# Patient Record
Sex: FEMALE | Race: WHITE | Employment: FULL TIME | URBAN - METROPOLITAN AREA
[De-identification: names, ages, dates, MRNs, and addresses within clinical notes are randomized per-mention and may not be internally consistent; named-entity substitution may affect disease eponyms.]

---

## 2017-08-08 ENCOUNTER — OFFICE VISIT (OUTPATIENT)
Dept: FAMILY MEDICINE | Facility: CLINIC | Age: 62
End: 2017-08-08

## 2017-08-08 VITALS
OXYGEN SATURATION: 95 % | DIASTOLIC BLOOD PRESSURE: 80 MMHG | SYSTOLIC BLOOD PRESSURE: 110 MMHG | WEIGHT: 151 LBS | HEIGHT: 67 IN | HEART RATE: 84 BPM | BODY MASS INDEX: 23.7 KG/M2

## 2017-08-08 DIAGNOSIS — M85.80 OSTEOPENIA, UNSPECIFIED LOCATION: ICD-10-CM

## 2017-08-08 DIAGNOSIS — H81.03 MENIERE'S DISEASE, BILATERAL: ICD-10-CM

## 2017-08-08 DIAGNOSIS — R42 VERTIGO: ICD-10-CM

## 2017-08-08 DIAGNOSIS — Z12.11 SPECIAL SCREENING FOR MALIGNANT NEOPLASMS, COLON: ICD-10-CM

## 2017-08-08 DIAGNOSIS — Z12.31 VISIT FOR SCREENING MAMMOGRAM: ICD-10-CM

## 2017-08-08 DIAGNOSIS — B00.1 RECURRENT COLD SORES: ICD-10-CM

## 2017-08-08 DIAGNOSIS — Z00.00 ROUTINE GENERAL MEDICAL EXAMINATION AT A HEALTH CARE FACILITY: Primary | ICD-10-CM

## 2017-08-08 DIAGNOSIS — R53.83 FATIGUE, UNSPECIFIED TYPE: ICD-10-CM

## 2017-08-08 DIAGNOSIS — Q43.8 REDUNDANT COLON: ICD-10-CM

## 2017-08-08 DIAGNOSIS — G35 MULTIPLE SCLEROSIS (H): ICD-10-CM

## 2017-08-08 LAB
% GRANULOCYTES: 62.2 % (ref 42.2–75.2)
HCT VFR BLD AUTO: 40 % (ref 35–46)
HEMOGLOBIN: 13.4 G/DL (ref 11.8–15.5)
LYMPHOCYTES NFR BLD AUTO: 31.1 % (ref 20.5–51.1)
MCH RBC QN AUTO: 29.5 PG (ref 27–31)
MCHC RBC AUTO-ENTMCNC: 33.6 G/DL (ref 33–37)
MCV RBC AUTO: 87.8 FL (ref 80–100)
MONOCYTES NFR BLD AUTO: 6.7 % (ref 1.7–9.3)
PLATELET # BLD AUTO: 228 K/UL (ref 140–450)
RBC # BLD AUTO: 4.55 X10/CMM (ref 3.7–5.2)
WBC # BLD AUTO: 6.3 X10/CMM (ref 3.8–11)

## 2017-08-08 PROCEDURE — 86618 LYME DISEASE ANTIBODY: CPT | Mod: 90 | Performed by: FAMILY MEDICINE

## 2017-08-08 PROCEDURE — 82607 VITAMIN B-12: CPT | Mod: 90 | Performed by: FAMILY MEDICINE

## 2017-08-08 PROCEDURE — 99213 OFFICE O/P EST LOW 20 MIN: CPT | Mod: 25 | Performed by: FAMILY MEDICINE

## 2017-08-08 PROCEDURE — 80061 LIPID PANEL: CPT | Mod: 90 | Performed by: FAMILY MEDICINE

## 2017-08-08 PROCEDURE — 99386 PREV VISIT NEW AGE 40-64: CPT | Performed by: FAMILY MEDICINE

## 2017-08-08 PROCEDURE — 36415 COLL VENOUS BLD VENIPUNCTURE: CPT | Performed by: FAMILY MEDICINE

## 2017-08-08 PROCEDURE — 80050 GENERAL HEALTH PANEL: CPT | Mod: 90 | Performed by: FAMILY MEDICINE

## 2017-08-08 PROCEDURE — 82306 VITAMIN D 25 HYDROXY: CPT | Mod: 90 | Performed by: FAMILY MEDICINE

## 2017-08-08 PROCEDURE — 84439 ASSAY OF FREE THYROXINE: CPT | Mod: 90 | Performed by: FAMILY MEDICINE

## 2017-08-08 RX ORDER — BACLOFEN 10 MG/1
TABLET ORAL
Refills: 2 | COMMUNITY
Start: 2017-03-11

## 2017-08-08 RX ORDER — ACYCLOVIR 400 MG/1
400 TABLET ORAL
COMMUNITY
Start: 2017-08-07 | End: 2017-08-08

## 2017-08-08 RX ORDER — HYDROCHLOROTHIAZIDE 12.5 MG/1
12.5 TABLET ORAL DAILY
Qty: 30 TABLET | Refills: 1 | Status: SHIPPED | OUTPATIENT
Start: 2017-08-08 | End: 2018-08-27

## 2017-08-08 RX ORDER — ACYCLOVIR 400 MG/1
400 TABLET ORAL 3 TIMES DAILY
Qty: 15 TABLET | Refills: 11 | Status: SHIPPED | OUTPATIENT
Start: 2017-08-08 | End: 2018-08-27

## 2017-08-08 NOTE — PROGRESS NOTES
SUBJECTIVE:   CC: Pau Waddell is an 61 year old woman who presents for preventive health visit.       MEDICAL PROBLEMS:  1. MS: Sees neurologist at [facility]. Due for MRI brain in January.  2. Recurrent cold sores: takes acyclovir at the first sign of cold sore, would like refills.  3. Vertigo/Tinnitus/hearing loss: no formal diagnosis of Meniere's disease, but still chronically affected by vertigo. She has never tried treatment for this other than prn Meclizine.  4. Osteopenia: takes calcium and vitamin D, tries to get exercise walking but she has a bad hip.  5. She reports severe fatigue this summer that is similar to what she has had with her MS. She sleeps long hours and feels exhuasted. [place] and although she has never seen a tick on her or the typical rash, she wonders if this could be related to Lyme's disease.    Healthy Habits:    Do you get at least three servings of calcium containing foods daily (dairy, green leafy vegetables, etc.)? yes and no, taking calcium and/or vitamin D supplement    Amount of exercise or daily activities, outside of work: 7 day(s) per week, walking, yard work    Problems taking medications regularly No    Medication side effects: No    Have you had an eye exam in the past two years? yes    Do you see a dentist twice per year? yes  Do you have sleep apnea, excessive snoring or daytime drowsiness?yes, daytime drowsiness    HEARING FREQUENCY:   Right Ear:  1000 Hz: failed    Left Ear:  1000 Hz: failed        Today's PHQ-2 Score:   PHQ-2 ( 1999 Pfizer) 8/8/2017   Q1: Little interest or pleasure in doing things 0   Q2: Feeling down, depressed or hopeless 0   PHQ-2 Score 0         Abuse: Current or Past(Physical, Sexual or Emotional)- No  Do you feel safe in your environment - Yes  Social History   Substance Use Topics     Smoking status: Never Smoker     Smokeless tobacco: Not on file      Comment: not exposed anymore to 2nd  hand smoke     Alcohol use Not on file     The patient does not drink >3 drinks per day nor >7 drinks per week.    Reviewed orders with patient.  Reviewed health maintenance and updated orders accordingly - Yes  Labs reviewed in EPIC  BP Readings from Last 3 Encounters:   08/08/17 110/80   03/15/11 125/76   11/19/05 110/64    Wt Readings from Last 3 Encounters:   08/08/17 68.5 kg (151 lb)   03/15/11 75.5 kg (166 lb 6.4 oz)   11/26/08 75.2 kg (165 lb 11.2 oz)                  Patient Active Problem List   Diagnosis     CARDIOVASCULAR SCREENING; LDL GOAL LESS THAN 160     Multiple sclerosis (H)     Dizziness and giddiness     No past surgical history on file.    Social History   Substance Use Topics     Smoking status: Never Smoker     Smokeless tobacco: Not on file      Comment: not exposed anymore to 2nd hand smoke     Alcohol use Not on file     Family History   Problem Relation Age of Onset     Coronary Artery Disease Mother      Arthritis Mother      Prostate Cancer Father      Myocardial Infarction Sister 76     Myocardial Infarction Brother 75     DIABETES Brother      Uterine Cancer Sister 62         Current Outpatient Prescriptions   Medication Sig Dispense Refill     baclofen (LIORESAL) 10 MG tablet TK 1 TO 2 TS PO Q 8 H PRF MUSCLE SPASM  2     Omega-3 Fatty Acids (FISH OIL PO)        acyclovir (ZOVIRAX) 400 MG tablet Take 1 tablet (400 mg) by mouth 3 times daily for 5 days 15 tablet 11     hydrochlorothiazide 12.5 MG TABS tablet Take 1 tablet (12.5 mg) by mouth daily 30 tablet 1     meclizine (ANTIVERT) 12.5 MG tablet Take 1-2 tablets by mouth 2 times daily as needed.       COPAXONE 20 MG SC SOLR 20 MG DAILY       FOLIC ACID 1 MG OR TABS ONE DAILY       CALCIUM 500 + D 500-125 MG-UNIT OR TABS 1 tab daily       VITAMIN C 500 MG OR TABS 4 tabs daily       CRANBERRY 250 MG OR TABS 2 tabs daily       [DISCONTINUED] acyclovir (ZOVIRAX) 400 MG tablet Take 400 mg by mouth       Allergies   Allergen Reactions      "Fentanyl      emesis       Iohexol      PN: Omnipaque     Omnipaque [[employer]]      hives       No lab results found.           Patient over age 50, mutual decision to screen reflected in health maintenance.      Pertinent mammograms are reviewed under the imaging tab.  History of abnormal Pap smear: NO - age 30- 65 PAP every 3 years recommended    Reviewed and updated as needed this visit by clinical staffTobacco  Allergies  Meds         Reviewed and updated as needed this visit by Provider        No past medical history on file.   No past surgical history on file.  Obstetric History       T2      L2     SAB0   TAB0   Ectopic0   Multiple0   Live Births0       # Outcome Date GA Lbr Milind/2nd Weight Sex Delivery Anes PTL Lv   2 Term            1 Term                   ROS: (except as noted above)  C: NEGATIVE for fever, chills, change in weight  I: NEGATIVE for worrisome rashes, moles or lesions  E: NEGATIVE for vision changes or irritation  ENT: NEGATIVE for ear, mouth and throat problems  R: NEGATIVE for significant cough or SOB  B: NEGATIVE for masses, tenderness or discharge  CV: NEGATIVE for chest pain, palpitations or peripheral edema  GI: NEGATIVE for nausea, abdominal pain, heartburn, or change in bowel habits  : NEGATIVE for unusual urinary or vaginal symptoms. No vaginal bleeding.  M: NEGATIVE for significant arthralgias or myalgia  N: NEGATIVE for weakness, dizziness or paresthesias  P: NEGATIVE for changes in mood or affect     OBJECTIVE:   /80  Pulse 84  Ht 1.695 m (5' 6.75\")  Wt 68.5 kg (151 lb)  SpO2 95%  BMI 23.83 kg/m2  EXAM:  GENERAL APPEARANCE: healthy, alert and no distress  EYES: Eyes grossly normal to inspection, PERRL and conjunctivae and sclerae normal  HENT: ear canals and TM's normal, nose and mouth without ulcers or lesions, oropharynx clear and oral mucous membranes moist  NECK: no adenopathy, no asymmetry, masses, or scars and thyroid normal " to palpation  RESP: lungs clear to auscultation - no rales, rhonchi or wheezes  BREAST: normal without masses, tenderness or nipple discharge and no palpable axillary masses or adenopathy  CV: regular rate and rhythm, normal S1 S2, no S3 or S4, no murmur, click or rub, no peripheral edema and peripheral pulses strong  ABDOMEN: soft, nontender, no hepatosplenomegaly, no masses and bowel sounds normal   (female): normal female external genitalia, normal urethral meatus, vaginal mucosal atrophy noted, normal cervix, adnexae, and uterus without masses or abnormal discharge  MS: no musculoskeletal defects are noted and gait is age appropriate without ataxia  SKIN: no suspicious lesions or rashes  NEURO: Normal strength and tone, sensory exam grossly normal, mentation intact and speech normal  PSYCH: mentation appears normal and affect normal/bright    ASSESSMENT/PLAN:   Pau was seen today for physical and hearing screening.    Diagnoses and all orders for this visit:    Routine general medical examination at a health care facility  -     Lipid Panel ([facility])  -     Pap IG rfx HPV ASCU ([facility])    Multiple sclerosis (H)    Recurrent cold sores  -     acyclovir (ZOVIRAX) 400 MG tablet; Take 1 tablet (400 mg) by mouth 3 times daily for 5 days    Vertigo/Meniere's disease, bilateral  -     hydrochlorothiazide 12.5 MG TABS tablet; Take 1 tablet (12.5 mg) by mouth daily  Follow up in a month to discuss results of HCTZ use for prevention of dizziness.    Osteopenia, unspecified location  -     Vitamin D  25-Hydroxy ([facility])  Continue vitamin D and calcium supplements.    Fatigue, unspecified type, follow up pending lab results.  -     TSH ([facility])  -     Thyroxine (T4) Free  Direct  S ([facility])  -     CBC with Diff/Plt ([facility])  -     Comp. Metabolic Panel (14) ([facility])  -     Vitamin D  25-Hydroxy ([facility])  -     Vitamin B12 ([facility])  -     Lyme  Total Ab Test/Reflex ([facility])    Visit for screening mammogram  -      "MAMMO -  Screening Digital Bilateral ([facility]); Future    Special screening for malignant neoplasms, colon/Redundant colon  -     GASTROENTEROLOGY ADULT REF CONSULT ONLY for colonoscopy          COUNSELING:   Reviewed preventive health counseling, as reflected in patient instructions       Regular exercise       Healthy diet/nutrition       Vision screening       Hearing screening       Osteoporosis Prevention/Bone Health       Colon cancer screening         reports that she has never smoked. She does not have any smokeless tobacco history on file.    Estimated body mass index is 23.83 kg/(m^2) as calculated from the following:    Height as of this encounter: 1.695 m (5' 6.75\").    Weight as of this encounter: 68.5 kg (151 lb).         Counseling Resources:  ATP IV Guidelines  Pooled Cohorts Equation Calculator  Breast Cancer Risk Calculator  FRAX Risk Assessment  ICSI Preventive Guidelines  Dietary Guidelines for Americans, 2010  USDA's MyPlate  ASA Prophylaxis  Lung CA Screening    Ariadna Shine MD  [facility]  "

## 2017-08-08 NOTE — MR AVS SNAPSHOT
After Visit Summary   8/8/2017    Pau Waddell    MRN: [id number]           Patient Information     Date Of Birth          1955        Visit Information        Provider Department      8/8/2017 8:00 AM Ariadna Shine MD [facility]        Today's Diagnoses     Routine general medical examination at a health care facility    -  1    Multiple sclerosis (H)        Redundant colon        Recurrent cold sores        Vertigo        Meniere's disease, bilateral        Osteopenia, unspecified location        Fatigue, unspecified type        Visit for screening mammogram        Special screening for malignant neoplasms, colon          Care Instructions      Preventive Health Recommendations  Female Ages 50 - 64    Yearly exam: See your health care provider every year in order to  o Review health changes.   o Discuss preventive care.    o Review your medicines if your doctor has prescribed any.      Get a Pap test every three years (unless you have an abnormal result and your provider advises testing more often).    If you get Pap tests with HPV test, you only need to test every 5 years, unless you have an abnormal result.     You do not need a Pap test if your uterus was removed (hysterectomy) and you have not had cancer.    You should be tested each year for STDs (sexually transmitted diseases) if you're at risk.     Have a mammogram every 1 to 2 years.    Have a colonoscopy at age 50, or have a yearly FIT test (stool test). These exams screen for colon cancer.      Have a cholesterol test every 5 years, or more often if advised.    Have a diabetes test (fasting glucose) every three years. If you are at risk for diabetes, you should have this test more often.     If you are at risk for osteoporosis (brittle bone disease), think about having a bone density scan (DEXA).    Shots: Get a flu shot each year. Get a tetanus shot every 10 years.    Nutrition:     Eat at least 5 servings  of fruits and vegetables each day.    Eat whole-grain bread, whole-wheat pasta and brown rice instead of white grains and rice.    Talk to your provider about Calcium and Vitamin D.     Lifestyle    Exercise at least 150 minutes a week (30 minutes a day, 5 days a week). This will help you control your weight and prevent disease.    Limit alcohol to one drink per day.    No smoking.     Wear sunscreen to prevent skin cancer.     See your dentist every six months for an exam and cleaning.    See your eye doctor every 1 to 2 years.            Follow-ups after your visit        Additional Services     GASTROENTEROLOGY ADULT REF CONSULT ONLY       Preferred Location: [facility] [phone]      Please be aware that coverage of these services is subject to the terms and limitations of your health insurance plan.  Call member services at your health plan with any benefit or coverage questions.  Any procedures must be performed at a [facility] facility OR coordinated by your clinic's referral office.    Please bring the following with you to your appointment:    (1) Any X-Rays, CTs or MRIs which have been performed.  Contact the facility where they were done to arrange for  prior to your scheduled appointment.    (2) List of current medications   (3) This referral request   (4) Any documents/labs given to you for this referral                  Future tests that were ordered for you today     Open Future Orders        Priority Expected Expires Ordered    MAMMO -  Screening Digital Bilateral ([facility]) Routine  8/8/2018 8/8/2017            Who to contact     If you have questions or need follow up information about today's clinic visit or your schedule please contact [facility] directly at [phone].  Normal or non-critical lab and imaging results will be communicated to you by MyChart, letter or phone within 4 business days after the clinic has received the results. If you do not hear from us  "within 7 days, please contact the clinic through [facility] or phone. If you have a critical or abnormal lab result, we will notify you by phone as soon as possible.  Submit refill requests through [facility] or call your pharmacy and they will forward the refill request to us. Please allow 3 business days for your refill to be completed.          Additional Information About Your Visit        [facility] Information     [facility] lets you send messages to your doctor, view your test results, renew your prescriptions, schedule appointments and more. To sign up, go to [email or web address] . Click on \"Log in\" on the left side of the screen, which will take you to the Welcome page. Then click on \"Sign up Now\" on the right side of the page.     You will be asked to enter the access code listed below, as well as some personal information. Please follow the directions to create your username and password.     Your access code is: [email or web address]  Expires: 2017 12:56 PM     Your access code will  in 90 days. If you need help or a new code, please call your [facility] clinic or [phone].        Care EveryWhere ID     This is your Care EveryWhere ID. This could be used by other organizations to access your [facility] medical records  [id number]        Your Vitals Were     Pulse Height Pulse Oximetry BMI (Body Mass Index)          84 1.695 m (5' 6.75\") 95% 23.83 kg/m2         Blood Pressure from Last 3 Encounters:   17 110/80   03/15/11 125/76   05 110/64    Weight from Last 3 Encounters:   17 68.5 kg (151 lb)   03/15/11 75.5 kg (166 lb 6.4 oz)   08 75.2 kg (165 lb 11.2 oz)              We Performed the Following     CBC with Diff/Plt ([facility])     Comp. Metabolic Panel (14) ([facility])     GASTROENTEROLOGY ADULT REF CONSULT ONLY     Lipid Panel ([facility])     Lyme  Total Ab Test/Reflex ([facility])     Pap IG rfx HPV ASCU ([facility])     Thyroxine (T4) Free  Direct  S ([facility])     TSH ([facility])     Vitamin B12 " ([facility])     Vitamin D  25-Hydroxy ([facility])          Today's Medication Changes          These changes are accurate as of: 8/8/17 12:56 PM.  If you have any questions, ask your nurse or doctor.               Start taking these medicines.        Dose/Directions    hydrochlorothiazide 12.5 MG Tabs tablet   Used for:  Meniere's disease, bilateral   Started by:  Ariadna Shine MD        Dose:  12.5 mg   Take 1 tablet (12.5 mg) by mouth daily   Quantity:  30 tablet   Refills:  1         These medicines have changed or have updated prescriptions.        Dose/Directions    acyclovir 400 MG tablet   Commonly known as:  ZOVIRAX   This may have changed:  when to take this   Used for:  Recurrent cold sores   Changed by:  Ariadna Shine MD        Dose:  400 mg   Take 1 tablet (400 mg) by mouth 3 times daily for 5 days   Quantity:  15 tablet   Refills:  11            Where to get your medicines      These medications were sent to [facility] [address] [place] [address] [address] [facility] [place] [address] [facility] [address]     Phone:  [phone]     acyclovir 400 MG tablet    hydrochlorothiazide 12.5 MG Tabs tablet                Primary Care Provider Office Phone # Fax #    Ariadna Shine [phone]       [facility] [address]  [facility] [address]        Equal Access to Services     [facility] AH: Hadii aad ku hadasho Soomaali, waaxda luqadaha, qaybta kaalmada adeegyada, shante martines hayjairn victor hugo shetty . So [facility] [phone].    ATENCIÓN: Si habla español, tiene a blanco disposición servicios gratuitos de asistencia lingüística. Llame al [phone].    We comply with applicable federal civil rights laws and [place] laws. We do not discriminate on the basis of race, color, national origin, age, disability sex, sexual orientation or gender identity.            Thank you!     Thank you for choosing [facility]   for your care. Our goal is always to provide you with excellent care. Hearing back from our patients is one way we can continue to improve our services. Please take a few minutes to complete the written survey that you may receive in the mail after your visit with us. Thank you!             Your Updated Medication List - Protect others around you: Learn how to safely use, store and throw away your medicines at www.disposemymeds.org.          This list is accurate as of: 8/8/17 12:56 PM.  Always use your most recent med list.                   Brand Name Dispense Instructions for use Diagnosis    acyclovir 400 MG tablet    ZOVIRAX    15 tablet    Take 1 tablet (400 mg) by mouth 3 times daily for 5 days    Recurrent cold sores       ascorbic acid 500 MG tablet    VITAMIN C     4 tabs daily        baclofen 10 MG tablet    LIORESAL     TK 1 TO 2 TS PO Q 8 H PRF MUSCLE SPASM        CALCIUM 500 + D 500-125 MG-UNIT Tabs   Generic drug:  Calcium Carbonate-Vitamin D      1 tab daily        COPAXONE 20 MG Solr      20 MG DAILY        Cranberry 250 MG Tabs      2 tabs daily        FISH OIL PO           folic acid 1 MG tablet    FOLVITE     ONE DAILY        hydrochlorothiazide 12.5 MG Tabs tablet     30 tablet    Take 1 tablet (12.5 mg) by mouth daily    Meniere's disease, bilateral       meclizine 12.5 MG tablet    ANTIVERT     Take 1-2 tablets by mouth 2 times daily as needed.

## 2017-08-08 NOTE — LETTER
[facility] [address] [place]  [address]  Phone: [phone]    August 14, 2017      Pau Juliaenedeliadenver  [address] [facility] [address]              Dear Pau,    Blood counts are normal.  Thyroid is normal.  Vitamin D level is low normal. Given your history of osteopenia I would recommend increasing Vitamin D supplement by 1,000 IU daily year round.  Vitamin B12 is in the low normal range. Recommend supplement as this could be playing a role in your fatigue.  No evidence of Lyme disease.  Liver function, kidney function, and electrolytes are normal. Normal glucose.  Total cholesterol, triglycerides, and LDL cholesterol are a bit elevated. I would recommend following a low saturated fat diet and limiting simple sugars. Recheck annually.  Pap was unsatisfactory due to sample being too small. We will repeat this next year.        Sincerely,     Ariadna Shine M.D.    Results for orders placed or performed in visit on 08/08/17   TSH ([facility])   Result Value Ref Range    TSH 2.930 0.450 - 4.500 uIU/mL    Narrative    Performed at:  [facility] [address], [place], [place]  [id number]  : Josiah Retana MD, Phone:  [phone]   Thyroxine (T4) Free  Direct  S ([facility])   Result Value Ref Range    T4 Free 1.28 0.82 - 1.77 ng/dL    Narrative    Performed at:  [facility] [address], [place], [place]  [id number]  : Josiah Retana MD, Phone:  [phone]   CBC with Diff/Plt (G)   Result Value Ref Range    WBC x10/cmm 6.3 3.8 - 11.0 x10/cmm    % Lymphocytes 31.1 20.5 - 51.1 %    % Monocytes 6.7 1.7 - 9.3 %    % Granulocytes 62.2 42.2 - 75.2 %    RBC x10/cmm 4.55 3.7 - 5.2 x10/cmm    Hemoglobin 13.4 11.8 - 15.5 g/dl    Hematocrit 40.0 35 - 46 %    MCV 87.8 80 - 100 fL    MCH 29.5 27.0 - 31.0 pg    MCHC 33.6 33.0 - 37.0 g/dL    Platelet Count 228 140 - 450 K/uL   Comp. Metabolic Panel (14) ([facility])   Result Value Ref Range    Glucose 92  65 - 99 mg/dL    Urea Nitrogen 9 8 - 27 mg/dL    Creatinine 0.61 0.57 - 1.00 mg/dL    eGFR If NonAfricn Am 98 >59 mL/min/1.73    eGFR If Africn Am 113 >59 mL/min/1.73    BUN/Creatinine Ratio 15 12 - 28    Sodium 140 134 - 144 mmol/L    Potassium 4.8 3.5 - 5.2 mmol/L    Chloride 99 96 - 106 mmol/L    Total CO2 31 (H) 18 - 28 mmol/L    Calcium 9.7 8.7 - 10.3 mg/dL    Protein Total 7.2 6.0 - 8.5 g/dL    Albumin 4.3 3.6 - 4.8 g/dL    Globulin, Total 2.9 1.5 - 4.5 g/dL    A/G Ratio 1.5 1.2 - 2.2    Bilirubin Total 0.5 0.0 - 1.2 mg/dL    Alkaline Phosphatase 77 39 - 117 IU/L    AST 18 0 - 40 IU/L    ALT 16 0 - 32 IU/L    Narrative    Performed at:  01 - [facility] [address], [place], [place]  [id number]  : Josiah Retana MD, Phone:  [phone]   [facility]   Result Value Ref Range    Vitamin D,25-Hydroxy 33.5 30.0 - 100.0 ng/mL    Narrative    Performed at:  01 - [facility] [address], [place], [place]  [id number]  : Josiah Retana MD, Phone:  [phone]   Vitamin B12 ([facility])   Result Value Ref Range    Vitamin B12 313 211 - 946 pg/mL    Narrative    Performed at:  01 - [facility] [address], [place], [place]  [id number]  : Josiah Retana MD, Phone:  [phone]   Lyme  Total Ab Test/Reflex ([facility])   Result Value Ref Range    Lyme IgG/IgM Ab <0.91 0.00 - 0.90 ISR    Narrative    Performed at:  [address]  [id number]  : KEYSHA Del Valle MD, Phone:  [phone]   Lipid Panel ([facility])   Result Value Ref Range    Cholesterol 233 (H) 100 - 199 mg/dL    Triglycerides 220 (H) 0 - 149 mg/dL    HDL Cholesterol 57 >39 mg/dL    VLDL Cholesterol Ole 44 (H) 5 - 40 mg/dL    LDL Cholesterol Calculated 132 (H) 0 - 99 mg/dL    LDL/HDL Ratio 2.3 0.0 - 3.2 ratio units    Narrative    Performed at:  01 - [facility] [address], [place], [place]  [id number]  : Josiah Retana MD, Phone:   [phone]   Pap IG rfx HPV ASCU ([facility])   Result Value Ref Range    DIAGNOSIS: Comment     Specimen adequacy: Comment     Clinician Provided ICD10 Comment     Performed by: Comment     QC reviewed by: Comment     . .     Pathologist Provided ICD10 Comment     Note: Comment     Test Methodology: Comment     . Comment     Narrative    Performed at:  01 - [facility]  [address] [place], [place]  [id number]  : Rosa Linton MD, Phone:  [phone]  Specimen Comment: Source.............Cervix  Specimen Comment: LMP / Prev Treat...None  Specimen Comment: Other..............Post Menopausal  Specimen Comment: No. of containers..01 CYTYC Thin Prep Vial

## 2017-08-10 LAB
ALBUMIN SERPL-MCNC: 4.3 G/DL (ref 3.6–4.8)
ALBUMIN/GLOB SERPL: 1.5 {RATIO} (ref 1.2–2.2)
ALP SERPL-CCNC: 77 IU/L (ref 39–117)
ALT SERPL-CCNC: 16 IU/L (ref 0–32)
AST SERPL-CCNC: 18 IU/L (ref 0–40)
BILIRUB SERPL-MCNC: 0.5 MG/DL (ref 0–1.2)
BUN SERPL-MCNC: 9 MG/DL (ref 8–27)
BUN/CREATININE RATIO: 15 (ref 12–28)
CALCIUM SERPL-MCNC: 9.7 MG/DL (ref 8.7–10.3)
CHLORIDE SERPLBLD-SCNC: 99 MMOL/L (ref 96–106)
CHOLEST SERPL-MCNC: 233 MG/DL (ref 100–199)
CREAT SERPL-MCNC: 0.61 MG/DL (ref 0.57–1)
EGFR IF AFRICN AM: 113 ML/MIN/1.73
EGFR IF NONAFRICN AM: 98 ML/MIN/1.73
GLOBULIN, TOTAL: 2.9 G/DL (ref 1.5–4.5)
GLUCOSE SERPL-MCNC: 92 MG/DL (ref 65–99)
HDLC SERPL-MCNC: 57 MG/DL
LDL/HDL RATIO: 2.3 RATIO UNITS (ref 0–3.2)
LDLC SERPL CALC-MCNC: 132 MG/DL (ref 0–99)
LYME IGG/IGM AB: <0.91 ISR (ref 0–0.9)
POTASSIUM SERPL-SCNC: 4.8 MMOL/L (ref 3.5–5.2)
PROT SERPL-MCNC: 7.2 G/DL (ref 6–8.5)
SODIUM SERPL-SCNC: 140 MMOL/L (ref 134–144)
T4 FREE SERPL-MCNC: 1.28 NG/DL (ref 0.82–1.77)
TOTAL CO2: 31 MMOL/L (ref 18–28)
TRIGL SERPL-MCNC: 220 MG/DL (ref 0–149)
TSH BLD-ACNC: 2.93 UIU/ML (ref 0.45–4.5)
VIT B12 SERPL-MCNC: 313 PG/ML (ref 211–946)
VITAMIN D, 25-HYDROXY: 33.5 NG/ML (ref 30–100)
VLDLC SERPL CALC-MCNC: 44 MG/DL (ref 5–40)

## 2017-08-12 LAB
.: NORMAL
.: NORMAL
CLINICIAN PROVIDED ICD10: NORMAL
DIAGNOSIS:: NORMAL
Lab: NORMAL
Lab: NORMAL
PATHOLOGIST PROVIDED ICD10: NORMAL
PERFORMED BY:: NORMAL
SPECIMEN ADEQUACY:: NORMAL
TEST METHODOLOGY:: NORMAL

## 2017-10-19 ENCOUNTER — TRANSFERRED RECORDS (OUTPATIENT)
Dept: FAMILY MEDICINE | Facility: CLINIC | Age: 62
End: 2017-10-19

## 2017-12-26 ENCOUNTER — HOSPITAL ENCOUNTER (OUTPATIENT)
Dept: MAMMOGRAPHY | Facility: CLINIC | Age: 62
Discharge: HOME | End: 2017-12-26
Attending: FAMILY MEDICINE | Admitting: FAMILY MEDICINE
Payer: COMMERCIAL

## 2017-12-26 DIAGNOSIS — Z12.31 VISIT FOR SCREENING MAMMOGRAM: ICD-10-CM

## 2017-12-26 PROCEDURE — G0202 SCR MAMMO BI INCL CAD: HCPCS

## 2017-12-26 PROCEDURE — 77063 BREAST TOMOSYNTHESIS BI: CPT

## 2018-01-17 ENCOUNTER — OFFICE VISIT (OUTPATIENT)
Dept: FAMILY MEDICINE | Facility: CLINIC | Age: 63
End: 2018-01-17

## 2018-01-17 VITALS
RESPIRATION RATE: 24 BRPM | SYSTOLIC BLOOD PRESSURE: 118 MMHG | DIASTOLIC BLOOD PRESSURE: 80 MMHG | TEMPERATURE: 98.6 F | OXYGEN SATURATION: 97 % | WEIGHT: 151.2 LBS | HEART RATE: 88 BPM | BODY MASS INDEX: 23.86 KG/M2

## 2018-01-17 DIAGNOSIS — J20.9 ACUTE BRONCHITIS, UNSPECIFIED ORGANISM: ICD-10-CM

## 2018-01-17 DIAGNOSIS — J01.01 ACUTE RECURRENT MAXILLARY SINUSITIS: Primary | ICD-10-CM

## 2018-01-17 PROCEDURE — 99213 OFFICE O/P EST LOW 20 MIN: CPT | Performed by: FAMILY MEDICINE

## 2018-01-17 RX ORDER — CEFPROZIL 500 MG/1
500 TABLET, FILM COATED ORAL 2 TIMES DAILY
Qty: 20 TABLET | Refills: 0 | Status: SHIPPED | OUTPATIENT
Start: 2018-01-17 | End: 2018-08-27

## 2018-01-17 RX ORDER — CODEINE PHOSPHATE AND GUAIFENESIN 10; 100 MG/5ML; MG/5ML
1-2 SOLUTION ORAL EVERY 4 HOURS PRN
Qty: 180 ML | Refills: 0 | Status: SHIPPED | OUTPATIENT
Start: 2018-01-17

## 2018-01-17 NOTE — PROGRESS NOTES
Problem(s) Oriented visit        SUBJECTIVE:                                                    Pau Waddell is a 62 year old female who presents to clinic today for congestion. She was treated for sinusitis at the end of December. She was improving, but then visited her sister-in-law who chain smokes. She instantly felt like her lungs were on fire. She is congested and coughing. No asthma. She is not short of breath.       Problem list, Medication list, Allergies, and Medical/Social/Surgical histories reviewed in EPIC and updated as appropriate.   Additional history: as documented    ROS:  5 point ROS completed and negative except noted above, including Gen, CV, Resp, GI, MS      Histories:   Patient Active Problem List   Diagnosis     CARDIOVASCULAR SCREENING; LDL GOAL LESS THAN 160     Multiple sclerosis (H)     Dizziness and giddiness     History reviewed. No pertinent surgical history.    Social History   Substance Use Topics     Smoking status: Never Smoker     Smokeless tobacco: Never Used      Comment: not exposed anymore to 2nd hand smoke     Alcohol use Yes     Family History   Problem Relation Age of Onset     Coronary Artery Disease Mother      Arthritis Mother      Prostate Cancer Father      Myocardial Infarction Sister 76     Myocardial Infarction Brother 75     DIABETES Brother      Uterine Cancer Sister 62           OBJECTIVE:                                                    /80  Pulse 88  Temp 98.6  F (37  C)  Resp 24  Wt 68.6 kg (151 lb 3.2 oz)  SpO2 97%  BMI 23.86 kg/m2  Body mass index is 23.86 kg/(m^2).   GENERAL APPEARANCE: Alert, no acute distress  HENT: right TM normal, left TM normal, nose purulent rhinorrhea bilateral, maxillary sinus tenderness bilateral, throat/mouth:mild erythema  NECK: No adenopathy,masses or thyromegaly  RESP: lungs clear to auscultation , but with deep, congested cough appreciated  CV: normal rate, regular rhythm, no murmur or gallop  NEURO:  Alert, oriented, speech and mentation normal  PSYCH: mentation appears normal, affect and mood normal   Labs Resulted Today:   Results for orders placed or performed during the hospital encounter of 12/26/17   MA Screen Bilateral w/Oscar    Narrative    SCREENING MAMMOGRAM, BILATERAL, DIGITAL w/CAD AND TOMOSYNTHESIS -  12/26/2017 9:31 AM.    BREAST SYMPTOMS: No current breast complaints.     COMPARISON:  12/23/2016, 12/21/2015, 12/9/2014, 12/2/2013.    BREAST DENSITY: Scattered fibroglandular densities.    COMMENTS: No findings of suspicion for malignancy.       Impression    IMPRESSION: BI-RADS CATEGORY: 1 - Negative.    RECOMMENDED FOLLOW-UP: Annual Mammography.  Recommend routine annual screening mammography.    Exam results letter mailed to patient.    JODIE CASTRO MD     ASSESSMENT/PLAN:                                                        Pau was seen today for cough.    Diagnoses and all orders for this visit:    Acute recurrent maxillary sinusitis  -     cefPROZIL (CEFZIL) 500 MG tablet; Take 1 tablet (500 mg) by mouth 2 times daily    Acute bronchitis, unspecified organism  -     cefPROZIL (CEFZIL) 500 MG tablet; Take 1 tablet (500 mg) by mouth 2 times daily  -     guaiFENesin-codeine (ROBITUSSIN AC) 100-10 MG/5ML SOLN solution; Take 5-10 mLs by mouth every 4 hours as needed for cough      Recommend Mucinex for symptomatic relief.    The following health maintenance items are reviewed in Epic and correct as of today:  Health Maintenance   Topic Date Due     TETANUS IMMUNIZATION (SYSTEM ASSIGNED)  08/18/1973     HEPATITIS C SCREENING  08/18/1973     ADVANCE DIRECTIVE PLANNING Q5 YRS  08/18/2010     INFLUENZA VACCINE (SYSTEM ASSIGNED)  09/01/2017     MAMMO SCREEN Q2 YR (SYSTEM ASSIGNED)  12/26/2019     PAP SCREENING Q3 YR (SYSTEM ASSIGNED)  08/08/2020     LIPID SCREEN Q5 YR FEMALE (SYSTEM ASSIGNED)  08/08/2022     COLON CANCER SCREEN (SYSTEM ASSIGNED)  10/19/2027       Ariadna Shine MD  [facility]  [facility]  [phone]    For any issues my office # is [phone]

## 2018-01-17 NOTE — LETTER
[facility] [address] [place] [address] [phone]      January 17, 2018        RE:  Pau Waddell  [address] [facility] [address] [facility] [address]            Pau was seen for illness. She is not allowed to return to work until her cough is improved, anticipate another 2-4 days.          Sincerely,        Ariadna Shine M.D.

## 2018-01-17 NOTE — MR AVS SNAPSHOT
"              After Visit Summary   2018    Pau Waddell    MRN: [id number]           Patient Information     Date Of Birth          1955        Visit Information        Provider Department      2018 1:30 PM Ariadna Shine MD [facility]        Today's Diagnoses     Acute recurrent maxillary sinusitis    -  1    Acute bronchitis, unspecified organism           Follow-ups after your visit        Who to contact     If you have questions or need follow up information about today's clinic visit or your schedule please contact [facility] directly at [phone].  Normal or non-critical lab and imaging results will be communicated to you by [employer], letter or phone within 4 business days after the clinic has received the results. If you do not hear from us within 7 days, please contact the clinic through [employer] or phone. If you have a critical or abnormal lab result, we will notify you by phone as soon as possible.  Submit refill requests through [employer] or call your pharmacy and they will forward the refill request to us. Please allow 3 business days for your refill to be completed.          Additional Information About Your Visit        MyChart Information     [employer] lets you send messages to your doctor, view your test results, renew your prescriptions, schedule appointments and more. To sign up, go to [email or web address] . Click on \"Log in\" on the left side of the screen, which will take you to the Welcome page. Then click on \"Sign up Now\" on the right side of the page.     You will be asked to enter the access code listed below, as well as some personal information. Please follow the directions to create your username and password.     Your access code is: [email or web address]  Expires: 2018  2:36 PM     Your access code will  in 90 days. If you need help or a new code, please call your [place] clinic or [phone].        Care EveryWhere ID     This " is your Care EveryWhere ID. This could be used by other organizations to access your [facility] medical records  [id number]        Your Vitals Were     Pulse Temperature Respirations Pulse Oximetry BMI (Body Mass Index)       88 98.6  F (37  C) 24 97% 23.86 kg/m2        Blood Pressure from Last 3 Encounters:   01/17/18 118/80   08/08/17 110/80   03/15/11 125/76    Weight from Last 3 Encounters:   01/17/18 68.6 kg (151 lb 3.2 oz)   08/08/17 68.5 kg (151 lb)   03/15/11 75.5 kg (166 lb 6.4 oz)              Today, you had the following     No orders found for display         Today's Medication Changes          These changes are accurate as of: 1/17/18  2:36 PM.  If you have any questions, ask your nurse or doctor.               Start taking these medicines.        Dose/Directions    cefPROZIL 500 MG tablet   Commonly known as:  CEFZIL   Used for:  Acute bronchitis, unspecified organism, Acute recurrent maxillary sinusitis   Started by:  Ariadna Shine MD        Dose:  500 mg   Take 1 tablet (500 mg) by mouth 2 times daily   Quantity:  20 tablet   Refills:  0       guaiFENesin-codeine 100-10 MG/5ML Soln solution   Commonly known as:  ROBITUSSIN AC   Used for:  Acute bronchitis, unspecified organism   Started by:  Ariadna Shine MD        Dose:  1-2 tsp.   Take 5-10 mLs by mouth every 4 hours as needed for cough   Quantity:  180 mL   Refills:  0            Where to get your medicines      These medications were sent to [facility] [address] [address] AT [facility] [address] [facility] [facility] [address]     Phone:  [phone]     cefPROZIL 500 MG tablet         Some of these will need a paper prescription and others can be bought over the counter.  Ask your nurse if you have questions.     Bring a paper prescription for each of these medications     guaiFENesin-codeine 100-10 MG/5ML Soln solution                Primary Care Provider Office Phone # Fax #    Ariadna  Kia Shine [phone]       [facility] [address]  [facility] [place] [address]        Equal Access to Services     ISAAC GOLDSTEIN : Dontrell annelise blakely angie Mabryali, waaxda luqadaha, qaybta kaalmada corina, shante edwards augustoemmanuel brown lanoemiesme shun. So [facility] [phone].    ATENCIÓN: Si habla español, tiene a blanco disposición servicios gratuitos de asistencia lingüística. Llame al [phone].    We comply with applicable federal civil rights laws and [place] laws. We do not discriminate on the basis of race, color, national origin, age, disability, sex, sexual orientation, or gender identity.            Thank you!     Thank you for choosing [facility]  for your care. Our goal is always to provide you with excellent care. Hearing back from our patients is one way we can continue to improve our services. Please take a few minutes to complete the written survey that you may receive in the mail after your visit with us. Thank you!             Your Updated Medication List - Protect others around you: Learn how to safely use, store and throw away your medicines at www.disposemymeds.org.          This list is accurate as of: 1/17/18  2:36 PM.  Always use your most recent med list.                   Brand Name Dispense Instructions for use Diagnosis    acyclovir 400 MG tablet    ZOVIRAX    15 tablet    Take 1 tablet (400 mg) by mouth 3 times daily for 5 days    Recurrent cold sores       ascorbic acid 500 MG tablet    VITAMIN C     4 tabs daily        baclofen 10 MG tablet    LIORESAL     TK 1 TO 2 TS PO Q 8 H PRF MUSCLE SPASM        CALCIUM 500 + D 500-125 MG-UNIT Tabs   Generic drug:  Calcium Carbonate-Vitamin D      1 tab daily        cefPROZIL 500 MG tablet    CEFZIL    20 tablet    Take 1 tablet (500 mg) by mouth 2 times daily    Acute bronchitis, unspecified organism, Acute recurrent maxillary sinusitis       COPAXONE 20 MG Solr      20 MG DAILY        Cranberry 250 MG  Tabs      2 tabs daily        FISH OIL PO           folic acid 1 MG tablet    FOLVITE     ONE DAILY        guaiFENesin-codeine 100-10 MG/5ML Soln solution    ROBITUSSIN AC    180 mL    Take 5-10 mLs by mouth every 4 hours as needed for cough    Acute bronchitis, unspecified organism       hydrochlorothiazide 12.5 MG Tabs tablet     30 tablet    Take 1 tablet (12.5 mg) by mouth daily    Meniere's disease, bilateral       meclizine 12.5 MG tablet    ANTIVERT     Take 1-2 tablets by mouth 2 times daily as needed.

## 2018-01-17 NOTE — LETTER
[facility] [address] [place] [address] [phone]      January 17, 2018      Pau Waddell  [address] [facility] [address] [address]            Pau is ABSOLUTELY NOT allowed to be in the home of a person who is smoking or to enter any non-smoke free environments (such as [place]). She is not allowed to ride in the car of a smoker. Her weakened immune system is not able to combat the poisons in cigarette smoke and her life is in danger with each exposure.          Sincerely,        Ariadna Shine M.D.

## 2018-01-19 ENCOUNTER — TELEPHONE (OUTPATIENT)
Dept: FAMILY MEDICINE | Facility: CLINIC | Age: 63
End: 2018-01-19

## 2018-01-19 DIAGNOSIS — J01.01 ACUTE RECURRENT MAXILLARY SINUSITIS: Primary | ICD-10-CM

## 2018-01-19 RX ORDER — SULFAMETHOXAZOLE/TRIMETHOPRIM 800-160 MG
1 TABLET ORAL 2 TIMES DAILY
Qty: 20 TABLET | Refills: 0 | Status: SHIPPED | OUTPATIENT
Start: 2018-01-19 | End: 2018-08-27

## 2018-01-19 NOTE — TELEPHONE ENCOUNTER
Patient called with update that she is very nausea after taking cefzil.  She is requesting alternative medication.  Per Dr. Jerry prescription for Bactrim DS sent to pharmacy.  Patient notified.  Farideh Costa

## 2018-01-24 ENCOUNTER — OFFICE VISIT (OUTPATIENT)
Dept: FAMILY MEDICINE | Facility: CLINIC | Age: 63
End: 2018-01-24

## 2018-01-24 VITALS
BODY MASS INDEX: 22.57 KG/M2 | OXYGEN SATURATION: 97 % | DIASTOLIC BLOOD PRESSURE: 76 MMHG | TEMPERATURE: 97.4 F | SYSTOLIC BLOOD PRESSURE: 116 MMHG | HEART RATE: 104 BPM | WEIGHT: 143 LBS

## 2018-01-24 DIAGNOSIS — R19.7 DIARRHEA, UNSPECIFIED TYPE: ICD-10-CM

## 2018-01-24 DIAGNOSIS — E86.0 DEHYDRATION: ICD-10-CM

## 2018-01-24 DIAGNOSIS — R11.2 INTRACTABLE VOMITING WITH NAUSEA, UNSPECIFIED VOMITING TYPE: Primary | ICD-10-CM

## 2018-01-24 PROCEDURE — 99214 OFFICE O/P EST MOD 30 MIN: CPT | Performed by: FAMILY MEDICINE

## 2018-01-24 RX ORDER — ONDANSETRON 4 MG/1
4 TABLET, FILM COATED ORAL EVERY 6 HOURS PRN
Qty: 18 TABLET | Refills: 1 | Status: SHIPPED | OUTPATIENT
Start: 2018-01-24

## 2018-01-24 NOTE — MR AVS SNAPSHOT
"              After Visit Summary   1/24/2018    Pau Waddell    MRN: [id number]           Patient Information     Date Of Birth          1955        Visit Information        Provider Department      1/24/2018 11:45 AM Ariadna Shine MD [facility]        Today's Diagnoses     Intractable vomiting with nausea, unspecified vomiting type    -  1    Diarrhea, unspecified type        Dehydration           Follow-ups after your visit        Future tests that were ordered for you today     Open Future Orders        Priority Expected Expires Ordered    C difficile Toxins A+B  EIA ([facility]) Routine  2/24/2018 1/24/2018            Who to contact     If you have questions or need follow up information about today's clinic visit or your schedule please contact [facility] directly at [phone].  Normal or non-critical lab and imaging results will be communicated to you by [employer], letter or phone within 4 business days after the clinic has received the results. If you do not hear from us within 7 days, please contact the clinic through [employer] or phone. If you have a critical or abnormal lab result, we will notify you by phone as soon as possible.  Submit refill requests through [employer] or call your pharmacy and they will forward the refill request to us. Please allow 3 business days for your refill to be completed.          Additional Information About Your Visit        [employer] Information     [employer] lets you send messages to your doctor, view your test results, renew your prescriptions, schedule appointments and more. To sign up, go to [email or web address] . Click on \"Log in\" on the left side of the screen, which will take you to the Welcome page. Then click on \"Sign up Now\" on the right side of the page.     You will be asked to enter the access code listed below, as well as some personal information. Please follow the directions to create your username and password.   "   Your access code is: [email or web address]  Expires: 2018  2:36 PM     Your access code will  in 90 days. If you need help or a new code, please call your [place] clinic or [phone].        Care EveryWhere ID     This is your Care EveryWhere ID. This could be used by other organizations to access your [place] medical records  [id number]        Your Vitals Were     Pulse Temperature Pulse Oximetry BMI (Body Mass Index)          104 97.4  F (36.3  C) 97% 22.57 kg/m2         Blood Pressure from Last 3 Encounters:   18 116/76   18 118/80   17 110/80    Weight from Last 3 Encounters:   18 64.9 kg (143 lb)   18 68.6 kg (151 lb 3.2 oz)   17 68.5 kg (151 lb)                 Today's Medication Changes          These changes are accurate as of 18 12:46 PM.  If you have any questions, ask your nurse or doctor.               Start taking these medicines.        Dose/Directions    ondansetron 4 MG tablet   Commonly known as:  ZOFRAN   Used for:  Intractable vomiting with nausea, unspecified vomiting type        Dose:  4 mg   Take 1 tablet (4 mg) by mouth every 6 hours as needed for nausea   Quantity:  18 tablet   Refills:  1            Where to get your medicines      These medications were sent to [facility] [id number] [place] [address] [address]  [address] [facility] [address]     Phone:  [phone]     ondansetron 4 MG tablet                Primary Care Provider Office Phone # Fax #    Ariadna Kia Shine [phone]       [facility] [address]  [facility] [address]        Equal Access to Services     [facility] ANGELITA : Dontrell dillono Soritchie, waaxda luqadaha, qaybta kaalmada adeegyacarolin, shante hoffmann. So [facility] [phone].    ATENCIÓN: Si habla español, tiene a blanco disposición servicios gratuitos de asistencia lingüística. Llame al [phone].    We comply  with applicable federal civil rights laws and [place] laws. We do not discriminate on the basis of race, color, national origin, age, disability, sex, sexual orientation, or gender identity.            Thank you!     Thank you for choosing [facility]  for your care. Our goal is always to provide you with excellent care. Hearing back from our patients is one way we can continue to improve our services. Please take a few minutes to complete the written survey that you may receive in the mail after your visit with us. Thank you!             Your Updated Medication List - Protect others around you: Learn how to safely use, store and throw away your medicines at www.disposemymeds.org.          This list is accurate as of 1/24/18 12:46 PM.  Always use your most recent med list.                   Brand Name Dispense Instructions for use Diagnosis    acyclovir 400 MG tablet    ZOVIRAX    15 tablet    Take 1 tablet (400 mg) by mouth 3 times daily for 5 days    Recurrent cold sores       ascorbic acid 500 MG tablet    VITAMIN C     4 tabs daily        baclofen 10 MG tablet    LIORESAL     TK 1 TO 2 TS PO Q 8 H PRF MUSCLE SPASM        CALCIUM 500 + D 500-125 MG-UNIT Tabs   Generic drug:  Calcium Carbonate-Vitamin D      1 tab daily        cefPROZIL 500 MG tablet    CEFZIL    20 tablet    Take 1 tablet (500 mg) by mouth 2 times daily    Acute bronchitis, unspecified organism, Acute recurrent maxillary sinusitis       COPAXONE 20 MG Solr      20 MG DAILY        Cranberry 250 MG Tabs      2 tabs daily        FISH OIL PO           folic acid 1 MG tablet    FOLVITE     ONE DAILY        guaiFENesin-codeine 100-10 MG/5ML Soln solution    ROBITUSSIN AC    180 mL    Take 5-10 mLs by mouth every 4 hours as needed for cough    Acute bronchitis, unspecified organism       hydrochlorothiazide 12.5 MG Tabs tablet     30 tablet    Take 1 tablet (12.5 mg) by mouth daily    Meniere's disease, bilateral       meclizine 12.5 MG  tablet    ANTIVERT     Take 1-2 tablets by mouth 2 times daily as needed.        ondansetron 4 MG tablet    ZOFRAN    18 tablet    Take 1 tablet (4 mg) by mouth every 6 hours as needed for nausea    Intractable vomiting with nausea, unspecified vomiting type       sulfamethoxazole-trimethoprim 800-160 MG per tablet    BACTRIM DS/SEPTRA DS    20 tablet    Take 1 tablet by mouth 2 times daily    Acute recurrent maxillary sinusitis

## 2018-01-24 NOTE — PROGRESS NOTES
Problem(s) Oriented visit        SUBJECTIVE:                                                    Pau Waddell is a 62 year old female who presents to clinic today for follow up on bronchitis and sinusitis. She was treated with Cefzil for 2 days but had vomiting. She was then changed to bactrim without resolution of vomiting. She now has both vomiting and diarrhea. She has tolerated very little po, yesterday only rice and plain yogurt. Water stays down if she sips it slowly. She is having loose stools for the last few days, not large volume. She has been sleeping a lot. Her cough and sinuses actually feel much better. She denies fever.       Problem list, Medication list, Allergies, and Medical/Social/Surgical histories reviewed in [facility] and updated as appropriate.   Additional history: as documented    ROS:  5 point ROS completed and negative except noted above, including Gen, CV, Resp, GI, MS      Histories:   Patient Active Problem List   Diagnosis     CARDIOVASCULAR SCREENING; LDL GOAL LESS THAN 160     Multiple sclerosis (H)     Dizziness and giddiness     No past surgical history on file.    Social History   Substance Use Topics     Smoking status: Never Smoker     Smokeless tobacco: Never Used      Comment: not exposed anymore to 2nd hand smoke     Alcohol use Yes     Family History   Problem Relation Age of Onset     Coronary Artery Disease Mother      Arthritis Mother      Prostate Cancer Father      Myocardial Infarction Sister 76     Myocardial Infarction Brother 75     DIABETES Brother      Uterine Cancer Sister 62           OBJECTIVE:                                                    /76  Pulse 104  Temp 97.4  F (36.3  C)  Wt 64.9 kg (143 lb)  SpO2 97%  BMI 22.57 kg/m2  Body mass index is 22.57 kg/(m^2).   GENERAL APPEARANCE ADULT: alert, oriented, tired appearing  EYES: PERRL, EOM normal, conjunctiva and lids normal  HENT: right TM normal, left TM normal, dry lips and oral mucosa  NECK:  No adenopathy,masses or thyromegaly  RESP: lungs clear to auscultation   CV: mildly tachycardic, no appreciable murmur.  ABDOMEN: active bowel sounds, soft, tender in the LUQ but without rebound or guarding.  MS: extremities normal, no peripheral edema  NEURO: Alert, oriented, speech and mentation normal  PSYCH: mentation appears normal, affect and mood normal   Labs Resulted Today:   Results for orders placed or performed during the hospital encounter of 12/26/17   MA Screen Bilateral w/Oscar    Narrative    SCREENING MAMMOGRAM, BILATERAL, DIGITAL w/CAD AND TOMOSYNTHESIS -  12/26/2017 9:31 AM.    BREAST SYMPTOMS: No current breast complaints.     COMPARISON:  12/23/2016, 12/21/2015, 12/9/2014, 12/2/2013.    BREAST DENSITY: Scattered fibroglandular densities.    COMMENTS: No findings of suspicion for malignancy.       Impression    IMPRESSION: BI-RADS CATEGORY: 1 - Negative.    RECOMMENDED FOLLOW-UP: Annual Mammography.  Recommend routine annual screening mammography.    Exam results letter mailed to patient.    JODIE CASTRO MD     ASSESSMENT/PLAN:                                                        Pau was seen today for vomiting.    Diagnoses and all orders for this visit:    Intractable vomiting with nausea, unspecified vomiting type  -     ondansetron (ZOFRAN) 4 MG tablet; Take 1 tablet (4 mg) by mouth every 6 hours as needed for nausea    Diarrhea, unspecified type  -     C difficile Toxins A+B  EIA ([facility]); Future, return stool for testing if diarrhea persists.    Dehydration  Slow, steady rehydration with clear liquids once nausea is adequately treated with ondansetron, slow advance of diet to bland as tolerated.    There are no Patient Instructions on file for this visit.    The following health maintenance items are reviewed in Epic and correct as of today:  Health Maintenance   Topic Date Due     TETANUS IMMUNIZATION (SYSTEM ASSIGNED)  08/18/1973     HEPATITIS C SCREENING  08/18/1973     ADVANCE  DIRECTIVE PLANNING Q5 YRS  08/18/2010     INFLUENZA VACCINE (SYSTEM ASSIGNED)  09/01/2017     MAMMO SCREEN Q2 YR (SYSTEM ASSIGNED)  12/26/2019     PAP SCREENING Q3 YR (SYSTEM ASSIGNED)  08/08/2020     LIPID SCREEN Q5 YR FEMALE (SYSTEM ASSIGNED)  08/08/2022     COLON CANCER SCREEN (SYSTEM ASSIGNED)  10/19/2027       Ariadna Shine MD  [facility]  [phone]    For any issues my office # is [phone]

## 2018-02-16 ENCOUNTER — TELEPHONE (OUTPATIENT)
Dept: FAMILY MEDICINE | Facility: CLINIC | Age: 63
End: 2018-02-16

## 2018-02-16 DIAGNOSIS — K64.9 HEMORRHOID: Primary | ICD-10-CM

## 2018-02-16 RX ORDER — HYDROCORTISONE ACETATE 25 MG/1
25 SUPPOSITORY RECTAL 2 TIMES DAILY
Qty: 28 SUPPOSITORY | Refills: 1 | Status: SHIPPED | OUTPATIENT
Start: 2018-02-16 | End: 2018-08-27

## 2018-02-16 NOTE — TELEPHONE ENCOUNTER
Patient  called requesting prescription for Anusol-HC for hemorrhoids.  Per Dr. Rl TODD for this prescription-sent to pharmacy.  Farideh Costa

## 2018-03-24 ENCOUNTER — TRANSFERRED RECORDS (OUTPATIENT)
Dept: FAMILY MEDICINE | Facility: CLINIC | Age: 63
End: 2018-03-24

## 2018-08-27 ENCOUNTER — OFFICE VISIT (OUTPATIENT)
Dept: FAMILY MEDICINE | Facility: CLINIC | Age: 63
End: 2018-08-27

## 2018-08-27 VITALS
SYSTOLIC BLOOD PRESSURE: 128 MMHG | BODY MASS INDEX: 23.7 KG/M2 | WEIGHT: 151 LBS | HEART RATE: 82 BPM | HEIGHT: 67 IN | DIASTOLIC BLOOD PRESSURE: 82 MMHG

## 2018-08-27 DIAGNOSIS — B00.1 RECURRENT COLD SORES: ICD-10-CM

## 2018-08-27 DIAGNOSIS — Z12.4 SCREENING FOR MALIGNANT NEOPLASM OF CERVIX: ICD-10-CM

## 2018-08-27 DIAGNOSIS — Z13.6 SCREENING FOR CARDIOVASCULAR CONDITION: ICD-10-CM

## 2018-08-27 DIAGNOSIS — Z00.00 ROUTINE GENERAL MEDICAL EXAMINATION AT A HEALTH CARE FACILITY: Primary | ICD-10-CM

## 2018-08-27 DIAGNOSIS — K64.9 HEMORRHOIDS, UNSPECIFIED HEMORRHOID TYPE: ICD-10-CM

## 2018-08-27 DIAGNOSIS — G35 MULTIPLE SCLEROSIS (H): ICD-10-CM

## 2018-08-27 PROCEDURE — 80061 LIPID PANEL: CPT | Mod: 90 | Performed by: FAMILY MEDICINE

## 2018-08-27 PROCEDURE — 99396 PREV VISIT EST AGE 40-64: CPT | Performed by: FAMILY MEDICINE

## 2018-08-27 PROCEDURE — 80053 COMPREHEN METABOLIC PANEL: CPT | Mod: 90 | Performed by: FAMILY MEDICINE

## 2018-08-27 PROCEDURE — 36415 COLL VENOUS BLD VENIPUNCTURE: CPT | Performed by: FAMILY MEDICINE

## 2018-08-27 RX ORDER — HYDROCORTISONE ACETATE 25 MG/1
25 SUPPOSITORY RECTAL 2 TIMES DAILY
Qty: 28 SUPPOSITORY | Refills: 1 | Status: SHIPPED | OUTPATIENT
Start: 2018-08-27

## 2018-08-27 RX ORDER — GLATIRAMER ACETATE 40 MG/ML
40 INJECTION, SOLUTION SUBCUTANEOUS
COMMUNITY
Start: 2018-08-13

## 2018-08-27 RX ORDER — ACYCLOVIR 400 MG/1
400 TABLET ORAL 3 TIMES DAILY
Qty: 15 TABLET | Refills: 11 | Status: SHIPPED | OUTPATIENT
Start: 2018-08-27 | End: 2019-01-02

## 2018-08-27 NOTE — LETTER
[place] [id number] [place]  [address]  Phone: [phone]    September 4, 2018      Pau Waddell  [address] [facility] [address]              Dear Pau,    The results from your recent visit showed that the Pap had too little cells to satisfactorily examine. You tested negative for HPV and therefore we do not need to repeat this.  Normal glucose, electrolytes, kidney function, and liver function.  Very good HDL cholesterol. Total and HDL cholesterol are a bit elevated and this can improve with low saturated fat diet. Triglycerides are elevated which can improve with lower sugar in diet. Recheck annually.          Sincerely,     Ariadna Shine M.D.    Results for orders placed or performed in visit on 08/27/18   Pap IG HPV hr and lr ([facility])   Result Value Ref Range    DIAGNOSIS: Comment     Specimen adequacy: Comment     Clinician Provided ICD10 Comment     Performed by: Comment     QC reviewed by: Comment     . .     Note: Comment     Test Methodology: Comment     HPV High Risk Negative Negative    HPV, low-risk Negative Negative    Narrative    Performed at:  01 - [facility]  [id number]  : Rosa Linton MD, Phone:  [phone]  Performed at:  02 - [facility]  [id number]  : Rosa Linton MD, Phone:  [phone]  Specimen Comment: [id number]  Specimen Comment: Source.............Endocervix  Specimen Comment: LMP / Prev Treat...None  Specimen Comment: Dates / Results....LMP 2008  Specimen Comment: No. of containers..01 ThinPrep Vial   Lipid Panel ([facility])   Result Value Ref Range    Cholesterol 230 (H) 100 - 199 mg/dL    Triglycerides 279 (H) 0 - 149 mg/dL    HDL Cholesterol 62 >39 mg/dL    VLDL Cholesterol Ole 56 (H) 5 - 40 mg/dL    LDL Cholesterol Calculated 112 (H) 0 - 99 mg/dL    LDL/HDL Ratio 1.8 0.0 - 3.2 ratio    Narrative     Performed at:  01 - [facility] [address], [place], [place]  [id number]  : Mitch Davis MD, Phone:  [phone]   Comp. Metabolic Panel (14) ([facility])   Result Value Ref Range    Glucose 93 65 - 99 mg/dL    Urea Nitrogen 10 8 - 27 mg/dL    Creatinine 0.61 0.57 - 1.00 mg/dL    eGFR If NonAfricn Am 97 >59 mL/min/1.73    eGFR If Africn Am 112 >59 mL/min/1.73    BUN/Creatinine Ratio 16 12 - 28    Sodium 144 134 - 144 mmol/L    Potassium 4.7 3.5 - 5.2 mmol/L    Chloride 103 96 - 106 mmol/L    Total CO2 27 20 - 29 mmol/L    Calcium 9.8 8.7 - 10.3 mg/dL    Protein Total 7.0 6.0 - 8.5 g/dL    Albumin 4.5 3.6 - 4.8 g/dL    Globulin, Total 2.5 1.5 - 4.5 g/dL    A/G Ratio 1.8 1.2 - 2.2    Bilirubin Total 0.5 0.0 - 1.2 mg/dL    Alkaline Phosphatase 83 39 - 117 IU/L    AST 19 0 - 40 IU/L    ALT 17 0 - 32 IU/L    Narrative    Performed at:  01 - [facility] [address], [place], [place]  [id number]  : Mitch Davis MD, Phone:  [phone]

## 2018-08-27 NOTE — PROGRESS NOTES
SUBJECTIVE:   CC: Pau Waddell is an 63 year old woman who presents for preventive health visit.     ISSUES TO ADDRESS TODAY:  1. Recurrent back spasm: needs baclofen a few times monthly to calm legs down when her hip is bothering her. She is in need for hip replacement (left). This works fairly well.   2. Tick bite: happened in June,  thought it was a wood tick. Resulted in a cellulitis, treated with doxycycline.   3. Hx MS: sees neurologist, on copaxone.    Healthy Habits:    Do you get at least three servings of calcium containing foods daily (dairy, green leafy vegetables, etc.)? yes    Amount of exercise or daily activities, outside of work: 0 day(s) per week, hip hurts to the point that she gets terrible pain after walking 2-3 blocks.     Problems taking medications regularly No    Medication side effects: No    Have you had an eye exam in the past two years? yes    Do you see a dentist twice per year? yes    Do you have sleep apnea, excessive snoring or daytime drowsiness?no      PROBLEMS TO ADD ON...tick bite in June, repeat pap.    Today's PHQ-2 Score:   PHQ-2 ( 1999 [employer]) 8/27/2018 8/8/2017   Q1: Little interest or pleasure in doing things 0 0   Q2: Feeling down, depressed or hopeless 0 0   PHQ-2 Score 0 0       Abuse: Current or Past(Physical, Sexual or Emotional)- No  Do you feel safe in your environment - Yes    Social History   Substance Use Topics     Smoking status: Never Smoker     Smokeless tobacco: Never Used      Comment: not exposed anymore to 2nd hand smoke     Alcohol use Yes     If you drink alcohol do you typically have >3 drinks per day or >7 drinks per week? No                     Reviewed orders with patient.  Reviewed health maintenance and updated orders accordingly - Yes  Labs reviewed in EPIC  BP Readings from Last 3 Encounters:   08/27/18 128/82   01/24/18 116/76   01/17/18 118/80    Wt Readings from Last 3 Encounters:   08/27/18 68.5 kg (151 lb)   01/24/18 64.9 kg  (143 lb)   01/17/18 68.6 kg (151 lb 3.2 oz)                  Patient Active Problem List   Diagnosis     CARDIOVASCULAR SCREENING; LDL GOAL LESS THAN 160     Multiple sclerosis (H)     Dizziness and giddiness     No past surgical history on file.    Social History   Substance Use Topics     Smoking status: Never Smoker     Smokeless tobacco: Never Used      Comment: not exposed anymore to 2nd hand smoke     Alcohol use Yes     Family History   Problem Relation Age of Onset     Coronary Artery Disease Mother      Arthritis Mother      Prostate Cancer Father      Myocardial Infarction Sister 76     Myocardial Infarction Brother 75     Diabetes Brother      Uterine Cancer Sister 62         Current Outpatient Prescriptions   Medication Sig Dispense Refill     acyclovir (ZOVIRAX) 400 MG tablet Take 1 tablet (400 mg) by mouth 3 times daily 15 tablet 11     baclofen (LIORESAL) 10 MG tablet TK 1 TO 2 TS PO Q 8 H PRF MUSCLE SPASM  2     CALCIUM 500 + D 500-125 MG-UNIT OR TABS 1 tab daily       COPAXONE 40 MG/ML SOSY Inject 40 mg as directed three times a week       CRANBERRY 250 MG OR TABS 2 tabs daily       FOLIC ACID 1 MG OR TABS ONE DAILY       guaiFENesin-codeine (ROBITUSSIN AC) 100-10 MG/5ML SOLN solution Take 5-10 mLs by mouth every 4 hours as needed for cough 180 mL 0     hydrocortisone (ANUSOL-HC) 25 MG Suppository Place 1 suppository (25 mg) rectally 2 times daily 28 suppository 1     meclizine (ANTIVERT) 12.5 MG tablet Take 1-2 tablets by mouth 2 times daily as needed.       Omega-3 Fatty Acids (FISH OIL PO) Take 1 capsule by mouth daily        ondansetron (ZOFRAN) 4 MG tablet Take 1 tablet (4 mg) by mouth every 6 hours as needed for nausea 18 tablet 1     VITAMIN C 500 MG OR TABS 4 tabs daily       [DISCONTINUED] acyclovir (ZOVIRAX) 400 MG tablet Take 1 tablet (400 mg) by mouth 3 times daily for 5 days 15 tablet 11     Allergies   Allergen Reactions     Cefzil [Cefprozil] Nausea and Vomiting     Fentanyl       "emesis       Iohexol      PN: Omnipaque     Omnipaque [[employer]]      hives       Recent Labs   Lab Test  17   0923   LDL  132*   HDL  57   TRIG  220*   ALT  16   CR  0.61   POTASSIUM  4.8        Patient over age 50, mutual decision to screen reflected in health maintenance.    Pertinent mammograms are reviewed under the imaging tab.  History of abnormal Pap smear: NO - age 30-65 PAP every 5 years with negative HPV co-testing recommended     Reviewed and updated as needed this visit by clinical staff  Tobacco  Allergies  Meds         Reviewed and updated as needed this visit by Provider        No past medical history on file.   No past surgical history on file.  Obstetric History       T2      L2     SAB0   TAB0   Ectopic0   Multiple0   Live Births0       # Outcome Date GA Lbr Milind/2nd Weight Sex Delivery Anes PTL Lv   2 Term            1 Term                   ROS:  CONSTITUTIONAL: NEGATIVE for fever, chills, change in weight  INTEGUMENTARY/SKIN: NEGATIVE for worrisome rashes, moles or lesions  EYES: NEGATIVE for vision changes or irritation  ENT: NEGATIVE for ear, mouth and throat problems  RESP: NEGATIVE for significant cough or SOB  BREAST: NEGATIVE for masses, tenderness or discharge  CV: NEGATIVE for chest pain, palpitations or peripheral edema  GI: NEGATIVE for nausea, abdominal pain, heartburn, or change in bowel habits  : NEGATIVE for unusual urinary or vaginal symptoms. No vaginal bleeding.  MUSCULOSKELETAL: NEGATIVE for significant arthralgias or myalgia  NEURO: NEGATIVE for weakness, dizziness or paresthesias  PSYCHIATRIC: NEGATIVE for changes in mood or affect     OBJECTIVE:   /82  Pulse 82  Ht 1.702 m (5' 7\")  Wt 68.5 kg (151 lb)  LMP 2018  Breastfeeding? No  BMI 23.65 kg/m2  EXAM:  GENERAL APPEARANCE: healthy, alert and no distress  EYES: Eyes grossly normal to inspection, PERRL and conjunctivae and sclerae normal  HENT: ear canals and TM's " normal, nose and mouth without ulcers or lesions, oropharynx clear and oral mucous membranes moist  NECK: no adenopathy, no asymmetry, masses, or scars and thyroid normal to palpation  RESP: lungs clear to auscultation - no rales, rhonchi or wheezes  BREAST: normal without masses, tenderness or nipple discharge and no palpable axillary masses or adenopathy  CV: regular rate and rhythm, normal S1 S2, no S3 or S4, no murmur, click or rub, no peripheral edema and peripheral pulses strong  ABDOMEN: soft, nontender, no hepatosplenomegaly, no masses and bowel sounds normal   (female): normal female external genitalia, normal urethral meatus, vaginal mucosal atrophy noted, normal cervix, adnexae, and uterus without masses or abnormal discharge  MS: no musculoskeletal defects are noted and gait is age appropriate without ataxia  SKIN: no suspicious lesions or rashes  NEURO: Normal strength and tone, sensory exam grossly normal, mentation intact and speech normal  PSYCH: mentation appears normal and affect normal/bright        ASSESSMENT/PLAN:   Pau was seen today for physical.    Diagnoses and all orders for this visit:    Routine general medical examination at a health care facility  Pau is a healthy-appearing 63-year-old female. Immunizations are up-to-date.  Breast self-exam reviewed.  Mammogram referral given.  Pap done today.    Recurrent cold sores  -     acyclovir (ZOVIRAX) 400 MG tablet; Take 1 tablet (400 mg) by mouth 3 times daily  Start acyclovir at first sign of a cold sore, continue for 5 days.    Hemorrhoids, unspecified hemorrhoid type  -     hydrocortisone (ANUSOL-HC) 25 MG Suppository; Place 1 suppository (25 mg) rectally 2 times daily  Use twice a day when hemorrhoid is flaring.    Screening for malignant neoplasm of cervix  -     Pap IG HPV hr and lr ([facility])    Multiple sclerosis (H)  Continue management per her neurologist.    Screening for cardiovascular condition  -     Lipid Panel  "([facility])  -     Comp. Metabolic Panel (14) ([facility])        COUNSELING:   Reviewed preventive health counseling, as reflected in patient instructions       Regular exercise       Healthy diet/nutrition       Vision screening       Hearing screening       Osteoporosis Prevention/Bone Health       Colon cancer screening    BP Readings from Last 1 Encounters:   08/27/18 128/82     Estimated body mass index is 23.65 kg/(m^2) as calculated from the following:    Height as of this encounter: 1.702 m (5' 7\").    Weight as of this encounter: 68.5 kg (151 lb).    BP Screening:   Last 3 BP Readings:    BP Readings from Last 3 Encounters:   08/27/18 128/82   01/24/18 116/76   01/17/18 118/80       The following was recommended to the patient:  Re-screen BP within a year and recommended lifestyle modifications       reports that she has never smoked. She has never used smokeless tobacco.      Counseling Resources:  ATP IV Guidelines  Pooled Cohorts Equation Calculator  Breast Cancer Risk Calculator  FRAX Risk Assessment  ICSI Preventive Guidelines  Dietary Guidelines for Americans, 2010  USDA's MyPlate  ASA Prophylaxis  Lung CA Screening    Ariadna Shine MD  [facility]  "

## 2018-08-27 NOTE — MR AVS SNAPSHOT
After Visit Summary   8/27/2018    Pau Waddell    MRN: [id number]           Patient Information     Date Of Birth          1955        Visit Information        Provider Department      8/27/2018 7:45 AM Ariadna Shine MD [facility]        Today's Diagnoses     Routine general medical examination at a health care facility    -  1    Recurrent cold sores        Hemorrhoids, unspecified hemorrhoid type        Screening for malignant neoplasm of cervix        Multiple sclerosis (H)        Screening for cardiovascular condition          Care Instructions      Preventive Health Recommendations  Female Ages 50 - 64    Yearly exam: See your health care provider every year in order to  o Review health changes.   o Discuss preventive care.    o Review your medicines if your doctor has prescribed any.      Get a Pap test every three years (unless you have an abnormal result and your provider advises testing more often).    If you get Pap tests with HPV test, you only need to test every 5 years, unless you have an abnormal result.     You do not need a Pap test if your uterus was removed (hysterectomy) and you have not had cancer.    You should be tested each year for STDs (sexually transmitted diseases) if you're at risk.     Have a mammogram every 1 to 2 years.    Have a colonoscopy at age 50, or have a yearly FIT test (stool test). These exams screen for colon cancer.      Have a cholesterol test every 5 years, or more often if advised.    Have a diabetes test (fasting glucose) every three years. If you are at risk for diabetes, you should have this test more often.     If you are at risk for osteoporosis (brittle bone disease), think about having a bone density scan (DEXA).    Shots: Get a flu shot each year. Get a tetanus shot every 10 years.    Nutrition:     Eat at least 5 servings of fruits and vegetables each day.    Eat whole-grain bread, whole-wheat pasta and brown rice  "instead of white grains and rice.    Get adequate Calcium and Vitamin D.     Lifestyle    Exercise at least 150 minutes a week (30 minutes a day, 5 days a week). This will help you control your weight and prevent disease.    Limit alcohol to one drink per day.    No smoking.     Wear sunscreen to prevent skin cancer.     See your dentist every six months for an exam and cleaning.    See your eye doctor every 1 to 2 years.            Follow-ups after your visit        Who to contact     If you have questions or need follow up information about today's clinic visit or your schedule please contact [facility] directly at [phone].  Normal or non-critical lab and imaging results will be communicated to you by MyChart, letter or phone within 4 business days after the clinic has received the results. If you do not hear from us within 7 days, please contact the clinic through MyChart or phone. If you have a critical or abnormal lab result, we will notify you by phone as soon as possible.  Submit refill requests through [employer] or call your pharmacy and they will forward the refill request to us. Please allow 3 business days for your refill to be completed.          Additional Information About Your Visit        Care EveryWhere ID     This is your Care EveryWhere ID. This could be used by other organizations to access your [place] medical records  [id number]        Your Vitals Were     Pulse Height Last Period Breastfeeding? BMI (Body Mass Index)       82 1.702 m (5' 7\") 08/20/2018 No 23.65 kg/m2        Blood Pressure from Last 3 Encounters:   08/27/18 128/82   01/24/18 116/76   01/17/18 118/80    Weight from Last 3 Encounters:   08/27/18 68.5 kg (151 lb)   01/24/18 64.9 kg (143 lb)   01/17/18 68.6 kg (151 lb 3.2 oz)              We Performed the Following     Comp. Metabolic Panel (14) ([facility])     Lipid Panel ([facility])     Pap IG HPV hr and lr ([facility])          Today's Medication Changes        "   These changes are accurate as of 8/27/18 12:03 PM.  If you have any questions, ask your nurse or doctor.               These medicines have changed or have updated prescriptions.        Dose/Directions    COPAXONE 40 MG/ML Sosy   This may have changed:  Another medication with the same name was removed. Continue taking this medication, and follow the directions you see here.   Generic drug:  Glatiramer Acetate   Changed by:  Ariadna Shine MD        Dose:  40 mg   Inject 40 mg as directed three times a week   Refills:  0         Stop taking these medicines if you haven't already. Please contact your care team if you have questions.     hydrochlorothiazide 12.5 MG Tabs tablet   Stopped by:  Ariadna Shine MD                Where to get your medicines      These medications were sent to [facility] [id number] [place] [address] [address] AT [facility] [address]  [address] [facility] [address]     Phone:  [phone]     acyclovir 400 MG tablet    hydrocortisone 25 MG Suppository                Primary Care Provider Office Phone # Fax #    Ariadna Shine [phone] [address] [place] [address]        Equal Access to Services     ISAAC GOLDSTEIN AH: Hadii annelise ku hadasho Soomaali, waaxda luqadaha, qaybta kaalmada adeegyada, shante idiin hayjairn victor hugo hoffmann. So [facility] [phone].    ATENCIÓN: Si habla español, tiene a blanco disposición servicios gratuitos de asistencia lingüística. Domingo al [phone].    We comply with applicable federal civil rights laws and [place] laws. We do not discriminate on the basis of race, color, national origin, age, disability, sex, sexual orientation, or gender identity.            Thank you!     Thank you for choosing [facility]  for your care. Our goal is always to provide you with excellent care. Hearing back from our patients is one way we can continue to improve our services.  Please take a few minutes to complete the written survey that you may receive in the mail after your visit with us. Thank you!             Your Updated Medication List - Protect others around you: Learn how to safely use, store and throw away your medicines at www.disposemymeds.org.          This list is accurate as of 8/27/18 12:03 PM.  Always use your most recent med list.                   Brand Name Dispense Instructions for use Diagnosis    acyclovir 400 MG tablet    ZOVIRAX    15 tablet    Take 1 tablet (400 mg) by mouth 3 times daily    Recurrent cold sores       ascorbic acid 500 MG tablet    VITAMIN C     4 tabs daily        baclofen 10 MG tablet    LIORESAL     TK 1 TO 2 TS PO Q 8 H PRF MUSCLE SPASM        CALCIUM 500 + D 500-125 MG-UNIT Tabs   Generic drug:  Calcium Carbonate-Vitamin D      1 tab daily        COPAXONE 40 MG/ML Sosy   Generic drug:  Glatiramer Acetate      Inject 40 mg as directed three times a week        Cranberry 250 MG Tabs      2 tabs daily        FISH OIL PO      Take 1 capsule by mouth daily        folic acid 1 MG tablet    FOLVITE     ONE DAILY        guaiFENesin-codeine 100-10 MG/5ML Soln solution    ROBITUSSIN AC    180 mL    Take 5-10 mLs by mouth every 4 hours as needed for cough    Acute bronchitis, unspecified organism       hydrocortisone 25 MG Suppository    ANUSOL-HC    28 suppository    Place 1 suppository (25 mg) rectally 2 times daily    Hemorrhoids, unspecified hemorrhoid type       meclizine 12.5 MG tablet    ANTIVERT     Take 1-2 tablets by mouth 2 times daily as needed.        ondansetron 4 MG tablet    ZOFRAN    18 tablet    Take 1 tablet (4 mg) by mouth every 6 hours as needed for nausea    Intractable vomiting with nausea, unspecified vomiting type

## 2018-08-28 LAB
ALBUMIN SERPL-MCNC: 4.5 G/DL (ref 3.6–4.8)
ALBUMIN/GLOB SERPL: 1.8 {RATIO} (ref 1.2–2.2)
ALP SERPL-CCNC: 83 IU/L (ref 39–117)
ALT SERPL-CCNC: 17 IU/L (ref 0–32)
AST SERPL-CCNC: 19 IU/L (ref 0–40)
BILIRUB SERPL-MCNC: 0.5 MG/DL (ref 0–1.2)
BUN SERPL-MCNC: 10 MG/DL (ref 8–27)
BUN/CREATININE RATIO: 16 (ref 12–28)
CALCIUM SERPL-MCNC: 9.8 MG/DL (ref 8.7–10.3)
CHLORIDE SERPLBLD-SCNC: 103 MMOL/L (ref 96–106)
CHOLEST SERPL-MCNC: 230 MG/DL (ref 100–199)
CREAT SERPL-MCNC: 0.61 MG/DL (ref 0.57–1)
EGFR IF AFRICN AM: 112 ML/MIN/1.73
EGFR IF NONAFRICN AM: 97 ML/MIN/1.73
GLOBULIN, TOTAL: 2.5 G/DL (ref 1.5–4.5)
GLUCOSE SERPL-MCNC: 93 MG/DL (ref 65–99)
HDLC SERPL-MCNC: 62 MG/DL
LDL/HDL RATIO: 1.8 RATIO (ref 0–3.2)
LDLC SERPL CALC-MCNC: 112 MG/DL (ref 0–99)
POTASSIUM SERPL-SCNC: 4.7 MMOL/L (ref 3.5–5.2)
PROT SERPL-MCNC: 7 G/DL (ref 6–8.5)
SODIUM SERPL-SCNC: 144 MMOL/L (ref 134–144)
TOTAL CO2: 27 MMOL/L (ref 20–29)
TRIGL SERPL-MCNC: 279 MG/DL (ref 0–149)
VLDLC SERPL CALC-MCNC: 56 MG/DL (ref 5–40)

## 2018-08-31 LAB
.: NORMAL
CLINICIAN PROVIDED ICD10: NORMAL
DIAGNOSIS:: NORMAL
HPV HIGH RISK: NEGATIVE
HPV, LOW RISK: NEGATIVE
Lab: NORMAL
Lab: NORMAL
PERFORMED BY:: NORMAL
SPECIMEN ADEQUACY:: NORMAL
TEST METHODOLOGY:: NORMAL

## 2018-12-27 ENCOUNTER — HOSPITAL ENCOUNTER (OUTPATIENT)
Dept: MAMMOGRAPHY | Facility: CLINIC | Age: 63
Discharge: HOME | End: 2018-12-27
Attending: FAMILY MEDICINE | Admitting: FAMILY MEDICINE
Payer: COMMERCIAL

## 2018-12-27 DIAGNOSIS — Z12.31 SCREENING MAMMOGRAM, ENCOUNTER FOR: ICD-10-CM

## 2018-12-27 PROCEDURE — 77063 BREAST TOMOSYNTHESIS BI: CPT

## 2018-12-31 ENCOUNTER — TELEPHONE (OUTPATIENT)
Dept: FAMILY MEDICINE | Facility: CLINIC | Age: 63
End: 2018-12-31

## 2018-12-31 DIAGNOSIS — G35 MULTIPLE SCLEROSIS (H): Primary | ICD-10-CM

## 2018-12-31 DIAGNOSIS — G35 MULTIPLE SCLEROSIS (H): ICD-10-CM

## 2018-12-31 LAB
BACTERIA URINE: ABNORMAL
BILIRUB UR QL STRIP: 0
BLOOD URINE DIP: ABNORMAL
CASTS/LPF: 0
COLOR UR: YELLOW
CRYSTAL URINE: 0
EPITHELIAL CELLS - QUEST: ABNORMAL
GLUCOSE UR STRIP-MCNC: 0 MG/DL
KETONES UR QL STRIP: 0
LEUKOCYTE ESTERASE URINE DIP: ABNORMAL
MUCOUS URINE: 0
NITRITE UR QL STRIP: ABNORMAL
PH UR STRIP: 5 PH (ref 5–9)
PROT UR QL: 0 MG/DL (ref ?–0.01)
RBC URINE: ABNORMAL (ref 0–3)
SP GR UR STRIP: 1 (ref 1–1.02)
UROBILINOGEN UR QL STRIP: 0.2 EU/DL (ref 0.2–1)
WBC URINE: ABNORMAL (ref 0–3)

## 2018-12-31 PROCEDURE — 81003 URINALYSIS AUTO W/O SCOPE: CPT | Performed by: FAMILY MEDICINE

## 2018-12-31 NOTE — TELEPHONE ENCOUNTER
Patient calls asking if could RTC today for lab appt to check urine for UTI.   Has MS and symptoms flaring. [facility] and was told by nurse for tianna SANTIAGO that she needs to rule out UTI as trigger for MS symptoms flare.   Saw Dr. Shine 8/2018 for cpx.   Patient would like to RTC for UA and have us fax it to nurse at [facility].   Patient will RTC at 145pm for UA.     Lane - nurse at [facility] called at 250pm stating they told patient to observe for now. But since patient has already come in for UA, if abnormal they would treat with antibiotics. They are closing at 3pm today. UA result not available yet. Advised will ask Dr. Shine to review result and treat if infection suspected, then will fax result and any action taken to neuro on 1/2/19.     4pm Dr. Shine reviewed UA=negative for infection. No culture sent. Patient informed and she will follow up with neurology about her MS symptoms. UA result faxed to Lane at [facility] fax: [phone].  Verona Armas RN

## 2019-01-02 DIAGNOSIS — B00.1 RECURRENT COLD SORES: ICD-10-CM

## 2019-01-02 RX ORDER — ACYCLOVIR 400 MG/1
400 TABLET ORAL 3 TIMES DAILY
Qty: 15 TABLET | Refills: 11 | Status: SHIPPED | OUTPATIENT
Start: 2019-01-02

## 2019-06-22 ENCOUNTER — OFFICE VISIT (OUTPATIENT)
Dept: URGENT CARE | Facility: URGENT CARE | Age: 64
End: 2019-06-22
Payer: COMMERCIAL

## 2019-06-22 VITALS
TEMPERATURE: 101.2 F | HEIGHT: 68 IN | DIASTOLIC BLOOD PRESSURE: 74 MMHG | RESPIRATION RATE: 12 BRPM | WEIGHT: 154.7 LBS | SYSTOLIC BLOOD PRESSURE: 132 MMHG | OXYGEN SATURATION: 95 % | BODY MASS INDEX: 23.45 KG/M2 | HEART RATE: 124 BPM

## 2019-06-22 DIAGNOSIS — R07.0 THROAT PAIN: ICD-10-CM

## 2019-06-22 DIAGNOSIS — J02.0 STREP THROAT: Primary | ICD-10-CM

## 2019-06-22 LAB
DEPRECATED S PYO AG THROAT QL EIA: ABNORMAL
SPECIMEN SOURCE: ABNORMAL

## 2019-06-22 PROCEDURE — 87880 STREP A ASSAY W/OPTIC: CPT | Performed by: PHYSICIAN ASSISTANT

## 2019-06-22 PROCEDURE — 99213 OFFICE O/P EST LOW 20 MIN: CPT | Performed by: PHYSICIAN ASSISTANT

## 2019-06-22 RX ORDER — ACETAMINOPHEN 500 MG
500 TABLET ORAL EVERY 6 HOURS PRN
COMMUNITY

## 2019-06-22 RX ORDER — IBUPROFEN 800 MG/1
800 TABLET, FILM COATED ORAL EVERY 8 HOURS PRN
Qty: 100 TABLET | Refills: 0 | Status: SHIPPED | OUTPATIENT
Start: 2019-06-22

## 2019-06-22 RX ORDER — NAPROXEN SODIUM 220 MG
440 TABLET ORAL 2 TIMES DAILY WITH MEALS
COMMUNITY

## 2019-06-22 RX ORDER — AMOXICILLIN 875 MG
875 TABLET ORAL 2 TIMES DAILY
Qty: 20 TABLET | Refills: 0 | Status: SHIPPED | OUTPATIENT
Start: 2019-06-22 | End: 2019-07-02

## 2019-06-22 ASSESSMENT — MIFFLIN-ST. JEOR: SCORE: 1305.21

## 2019-06-22 ASSESSMENT — PAIN SCALES - GENERAL: PAINLEVEL: SEVERE PAIN (6)

## 2019-06-23 NOTE — PROGRESS NOTES
"Patient presents with:  Urgent Care  Fever: Fever, sore throat, white spots in throat, HA, fever x 2d  Worried about MS flaring up      SUBJECTIVE:   Pau Waddell is a 63 year old female presenting with a chief complaint of:  1) sore throat  2) fever  3) fatigue  Onset yesterday.     Has MS.     Here with her daughter today.      Treatment measures tried include None tried.  Predisposing factors include around grandkids recently, also stayed with another family member who was hospitalized for a fever recently.    No past medical history on file.  Patient Active Problem List   Diagnosis     CARDIOVASCULAR SCREENING; LDL GOAL LESS THAN 160     Multiple sclerosis (H)     Dizziness and giddiness     Social History     Tobacco Use     Smoking status: Never Smoker     Smokeless tobacco: Never Used     Tobacco comment: not exposed anymore to 2nd hand smoke   Substance Use Topics     Alcohol use: Yes       ROS:  CONSTITUTIONAL:as per HPI  INTEGUMENTARY/SKIN: NEGATIVE for worrisome rashes, moles or lesions  EYES: NEGATIVE for vision changes or irritation  ENT/MOUTH: as per HPI  RESP:NEGATIVE for significant cough or SOB  CV: NEGATIVE for chest pain, palpitations or peripheral edema  GI: NEGATIVE for nausea, abdominal pain, heartburn, or change in bowel habits  MUSCULOSKELETAL: NEGATIVE for significant arthralgias or myalgia  NEURO: NEGATIVE for weakness, dizziness or paresthesias  Review of systems negative except as stated above.    OBJECTIVE  :/74 (BP Location: Left arm, Patient Position: Sitting, Cuff Size: Adult Regular)   Pulse 124   Temp 101.2  F (38.4  C) (Tympanic)   Resp 12   Ht 1.727 m (5' 8\")   Wt 70.2 kg (154 lb 11.2 oz)   LMP 08/20/2018 (LMP Unknown)   SpO2 95%   Breastfeeding? No   BMI 23.52 kg/m    GENERAL APPEARANCE: healthy, alert and no distress  EYES: EOMI,  PERRL, conjunctiva clear  HENT: ear canals and TM's normal.  Nose and mouth without ulcers, erythema or lesions  HENT: " erythematous OP  NECK: supple, with anterior cervical lymphadenopathy  RESP: lungs clear to auscultation - no rales, rhonchi or wheezes  CV: regular rates and rhythm, normal S1 S2, no murmur noted  ABDOMEN:  soft, nontender, no HSM or masses and bowel sounds normal  NEURO: Normal strength and tone, sensory exam grossly normal,  normal speech and mentation  SKIN: no suspicious lesions or rashes    (J02.0) Strep throat  (primary encounter diagnosis)  Comment:   Plan: amoxicillin (AMOXIL) 875 MG tablet        Considered contagious for 24 hours.      REST    (R07.0) Throat pain  Comment:   Plan: Rapid strep screen, ibuprofen (ADVIL/MOTRIN)         800 MG tablet    Patient expresses understanding and agreement with the assessment and plan as above.

## 2019-06-23 NOTE — PATIENT INSTRUCTIONS
(J02.0) Strep throat  (primary encounter diagnosis)  Comment:   Plan: amoxicillin (AMOXIL) 875 MG tablet        Considered contagious for 24 hours.      REST    (R07.0) Throat pain  Comment:   Plan: Rapid strep screen, ibuprofen (ADVIL/MOTRIN)         800 MG tablet

## 2019-11-07 ENCOUNTER — HEALTH MAINTENANCE LETTER (OUTPATIENT)
Age: 64
End: 2019-11-07

## 2019-12-16 ENCOUNTER — TRANSFERRED RECORDS (OUTPATIENT)
Dept: FAMILY MEDICINE | Facility: CLINIC | Age: 64
End: 2019-12-16

## 2019-12-30 ENCOUNTER — HOSPITAL ENCOUNTER (OUTPATIENT)
Dept: MAMMOGRAPHY | Facility: CLINIC | Age: 64
Discharge: HOME | End: 2019-12-30
Attending: FAMILY MEDICINE | Admitting: FAMILY MEDICINE
Payer: COMMERCIAL

## 2019-12-30 DIAGNOSIS — Z12.31 VISIT FOR SCREENING MAMMOGRAM: ICD-10-CM

## 2019-12-30 PROCEDURE — 77063 BREAST TOMOSYNTHESIS BI: CPT

## 2020-04-27 ENCOUNTER — TRANSFERRED RECORDS (OUTPATIENT)
Dept: FAMILY MEDICINE | Facility: CLINIC | Age: 65
End: 2020-04-27

## 2020-05-04 ENCOUNTER — TRANSFERRED RECORDS (OUTPATIENT)
Dept: FAMILY MEDICINE | Facility: CLINIC | Age: 65
End: 2020-05-04

## 2020-11-29 ENCOUNTER — HEALTH MAINTENANCE LETTER (OUTPATIENT)
Age: 65
End: 2020-11-29

## 2021-01-04 ENCOUNTER — HOSPITAL ENCOUNTER (OUTPATIENT)
Dept: MAMMOGRAPHY | Facility: CLINIC | Age: 66
Discharge: HOME | End: 2021-01-04
Attending: FAMILY MEDICINE | Admitting: INTERNAL MEDICINE
Payer: COMMERCIAL

## 2021-01-04 DIAGNOSIS — Z12.31 VISIT FOR SCREENING MAMMOGRAM: ICD-10-CM

## 2021-01-04 PROCEDURE — 77063 BREAST TOMOSYNTHESIS BI: CPT

## 2021-09-25 ENCOUNTER — HEALTH MAINTENANCE LETTER (OUTPATIENT)
Age: 66
End: 2021-09-25

## 2022-01-15 ENCOUNTER — HEALTH MAINTENANCE LETTER (OUTPATIENT)
Age: 67
End: 2022-01-15

## 2022-12-26 ENCOUNTER — HEALTH MAINTENANCE LETTER (OUTPATIENT)
Age: 67
End: 2022-12-26

## 2023-04-16 ENCOUNTER — HEALTH MAINTENANCE LETTER (OUTPATIENT)
Age: 68
End: 2023-04-16

## 2024-08-09 ENCOUNTER — TRANSFERRED RECORDS (OUTPATIENT)
Dept: HEALTH INFORMATION MANAGEMENT | Facility: CLINIC | Age: 69
End: 2024-08-09
Payer: COMMERCIAL